# Patient Record
Sex: FEMALE | Race: WHITE | NOT HISPANIC OR LATINO | Employment: FULL TIME | URBAN - METROPOLITAN AREA
[De-identification: names, ages, dates, MRNs, and addresses within clinical notes are randomized per-mention and may not be internally consistent; named-entity substitution may affect disease eponyms.]

---

## 2020-08-17 ENCOUNTER — OFFICE VISIT (OUTPATIENT)
Dept: WOUND CARE | Facility: HOSPITAL | Age: 65
End: 2020-08-17
Attending: SURGERY
Payer: COMMERCIAL

## 2020-08-17 VITALS — HEART RATE: 82 BPM | SYSTOLIC BLOOD PRESSURE: 152 MMHG | DIASTOLIC BLOOD PRESSURE: 99 MMHG | TEMPERATURE: 98 F

## 2020-08-17 DIAGNOSIS — L58.9 POST-RADIATION DERMATITIS: ICD-10-CM

## 2020-08-17 DIAGNOSIS — T81.31XD POSTOPERATIVE WOUND DEHISCENCE, SUBSEQUENT ENCOUNTER: ICD-10-CM

## 2020-08-17 PROBLEM — T81.31XA WOUND DEHISCENCE, SURGICAL: Status: ACTIVE | Noted: 2020-08-17

## 2020-08-17 PROCEDURE — 99203 OFFICE O/P NEW LOW 30 MIN: CPT

## 2020-08-17 PROCEDURE — 99499 NO LOS: ICD-10-PCS | Mod: ,,, | Performed by: SURGERY

## 2020-08-17 PROCEDURE — 99499 UNLISTED E&M SERVICE: CPT | Mod: ,,, | Performed by: SURGERY

## 2020-08-17 PROCEDURE — 27201912 HC WOUND CARE DEBRIDEMENT SUPPLIES

## 2020-08-17 PROCEDURE — 11042 DBRDMT SUBQ TIS 1ST 20SQCM/<: CPT

## 2020-08-17 NOTE — PROGRESS NOTES
[facility]  Wound Care  History and Physical    Problem List Items Addressed This Visit     Wound dehiscence, surgical    Overview     65-year-old female with history of advanced breast cancer who underwent bilateral mastectomy several months ago.  Patient developed dehiscence of the lateral aspect of the left mastectomy site.  Patient subsequently received postoperative radiation and has open wound which has not resolved.  Patient developed some radiation dermatitis which is improving.  Patient seen recently in the office for this wound which had not resolved.  Patient started on Santyl last week.  Patient denies fever or chills.  She does have reports some pain over the area.  Patient has completed postoperative radiation about 2 months ago.  Patient denies any significant drainage         Current Assessment & Plan     Continue Santyl.  Calmoseptine to periwound area         Post-radiation dermatitis    Overview     Patient is status post left mastectomy and axillary dissection for breast cancer.  Patient status post postoperative radiation.  Patient with radiation dermatitis.  Patient has been applying Vaseline and steroid cream intermittently.                 History:    Past Medical History:   Diagnosis Date    Arthritis     Coronary artery disease     Fibromyalgia     Hyperlipidemia     Hypertension     Myocardial infarction 6/2013    Thyroid disease        Past Surgical History:   Procedure Laterality Date    TUBAL LIGATION         Family History   Problem Relation Age of Onset    Heart disease Mother     Heart disease Father     Diabetes Father         reports that she quit smoking about 18 years ago. She has a 13.00 pack-year smoking history. She has never used smokeless tobacco. She reports that she does not drink alcohol or use drugs.    has a current medication list which includes the following prescription(s): aspirin, cholecalciferol (vitamin d3), diclofenac sodium, fish  oil-omega-3 fatty acids, fluoxetine, gabapentin, levothyroxine, and metoprolol succinate.    Allergies:  Lisinopril    Review of Systems:  ROS  Denies fever chills  Denies cough or shortness of breath  Denies chest pain  Denies abdominal pain    There were no vitals filed for this visit.      BMI:  There is no height or weight on file to calculate BMI.    Physical Exam:  Physical Exam  Obese  Right mastectomy site well healed  Left mastectomy site with changes consistent with radiation.  There is a open full-thickness wound on the lateral aspect of the incision site.  There is a moderate amount of slough present.  There is no drainage.  There is no sign of infection.  A1C:  No results for input(s): HGBA1C in the last 2160 hours.  BMP:  No results for input(s): GLU, NA, K, CL, CO2, BUN, CREATININE, CALCIUM, MG in the last 2160 hours.   CBC:  No results for input(s): WBC, RBC, HGB, HCT, PLT, MCV, MCH, MCHC in the last 2160 hours.  CMP:  No results for input(s): GLU, CALCIUM, ALBUMIN, PROT, NA, K, CO2, CL, BUN, ALKPHOS, ALT, AST, BILITOT in the last 2160 hours.    Invalid input(s): CREATININ  PREALBUMIN:  No results for input(s): PREALBUMIN in the last 2160 hours.  WOUND CULTURES:  No results for input(s): LABAERO in the last 2160 hours.        Plan:  See Wound Docs note for plan and follow up.  Continue Santyl to the wound.  Calmoseptine to periwound area and chest wall      Kelton CANO Marino [facility]

## 2020-08-24 ENCOUNTER — OFFICE VISIT (OUTPATIENT)
Dept: WOUND CARE | Facility: HOSPITAL | Age: 65
End: 2020-08-24
Attending: SURGERY
Payer: COMMERCIAL

## 2020-08-24 VITALS
TEMPERATURE: 98 F | RESPIRATION RATE: 20 BRPM | HEART RATE: 68 BPM | DIASTOLIC BLOOD PRESSURE: 86 MMHG | SYSTOLIC BLOOD PRESSURE: 138 MMHG

## 2020-08-24 DIAGNOSIS — T81.31XA WOUND DEHISCENCE, SURGICAL: ICD-10-CM

## 2020-08-24 DIAGNOSIS — L58.9 POST-RADIATION DERMATITIS: ICD-10-CM

## 2020-08-24 PROCEDURE — 99499 UNLISTED E&M SERVICE: CPT | Mod: ,,, | Performed by: SURGERY

## 2020-08-24 PROCEDURE — 99499 NO LOS: ICD-10-PCS | Mod: ,,, | Performed by: SURGERY

## 2020-08-24 PROCEDURE — 27201912 HC WOUND CARE DEBRIDEMENT SUPPLIES

## 2020-08-24 PROCEDURE — 11042 DBRDMT SUBQ TIS 1ST 20SQCM/<: CPT

## 2020-08-24 NOTE — PROGRESS NOTES
[facility]  Wound Care  Progress Note    Problem List Items Addressed This Visit        Derm    Post-radiation dermatitis    Overview     Patient is status post left mastectomy and axillary dissection for breast cancer.  Patient status post postoperative radiation.  Patient with radiation dermatitis.  Patient has been applying Vaseline and steroid cream intermittently.            Other    Wound dehiscence, surgical    Overview     65-year-old female with history of advanced breast cancer who underwent bilateral mastectomy several months ago.  Patient developed dehiscence of the lateral aspect of the left mastectomy site.  Patient subsequently received postoperative radiation and has open wound which has not resolved.  Patient developed some radiation dermatitis which is improving.  Patient seen recently in the office for this wound which had not resolved.  Patient started on Santyl last week.  Patient denies fever or chills.  She does have reports some pain over the area.  Patient has completed postoperative radiation about 2 months ago.  Patient denies any significant drainage         Current Assessment & Plan     Wound improving.  Continue debridement to maintain active phase wound healing.  I do expect prolonged healing wound closure due to the radiation.               See wound doc progress notes. Documents will be scanned.        Sterling Tariq  [facility]

## 2020-08-31 ENCOUNTER — OFFICE VISIT (OUTPATIENT)
Dept: WOUND CARE | Facility: HOSPITAL | Age: 65
End: 2020-08-31
Attending: SURGERY
Payer: COMMERCIAL

## 2020-08-31 VITALS — DIASTOLIC BLOOD PRESSURE: 86 MMHG | TEMPERATURE: 98 F | SYSTOLIC BLOOD PRESSURE: 150 MMHG | HEART RATE: 80 BPM

## 2020-08-31 DIAGNOSIS — T81.31XD POSTOPERATIVE WOUND DEHISCENCE, SUBSEQUENT ENCOUNTER: ICD-10-CM

## 2020-08-31 PROCEDURE — 27201912 HC WOUND CARE DEBRIDEMENT SUPPLIES

## 2020-08-31 PROCEDURE — 99499 UNLISTED E&M SERVICE: CPT | Mod: ,,, | Performed by: SURGERY

## 2020-08-31 PROCEDURE — 99499 NO LOS: ICD-10-PCS | Mod: ,,, | Performed by: SURGERY

## 2020-08-31 PROCEDURE — 11042 DBRDMT SUBQ TIS 1ST 20SQCM/<: CPT

## 2020-08-31 NOTE — PROGRESS NOTES
[facility]  Wound Care  Progress Note    Problem List Items Addressed This Visit     Wound dehiscence, surgical    Overview     65-year-old female with history of advanced breast cancer who underwent bilateral mastectomy several months ago.  Patient developed dehiscence of the lateral aspect of the left mastectomy site.  Patient subsequently received postoperative radiation and has open wound which has not resolved.  Patient developed some radiation dermatitis which is improving.  Patient seen recently in the office for this wound which had not resolved.  Patient started on Santyl last week.  Patient denies fever or chills.  She does have reports some pain over the area.  Patient has completed postoperative radiation about 2 months ago.  Patient denies any significant drainage         Current Assessment & Plan     Wound slowly improving.  There is some granulation tissue present.  Moderate amount of slough remains.  Continued sharp debridement is needed.  Continue Santyl.               See wound doc progress notes. Documents will be scanned.        Kelton Alexander  [facility]

## 2020-09-14 ENCOUNTER — OFFICE VISIT (OUTPATIENT)
Dept: WOUND CARE | Facility: HOSPITAL | Age: 65
End: 2020-09-14
Attending: SURGERY
Payer: COMMERCIAL

## 2020-09-14 VITALS — DIASTOLIC BLOOD PRESSURE: 74 MMHG | SYSTOLIC BLOOD PRESSURE: 138 MMHG | TEMPERATURE: 98 F | HEART RATE: 72 BPM

## 2020-09-14 DIAGNOSIS — T81.31XD POSTOPERATIVE WOUND DEHISCENCE, SUBSEQUENT ENCOUNTER: ICD-10-CM

## 2020-09-14 PROCEDURE — 11042 DBRDMT SUBQ TIS 1ST 20SQCM/<: CPT

## 2020-09-14 PROCEDURE — 99499 NO LOS: ICD-10-PCS | Mod: ,,, | Performed by: SURGERY

## 2020-09-14 PROCEDURE — 27201912 HC WOUND CARE DEBRIDEMENT SUPPLIES

## 2020-09-14 PROCEDURE — 99499 UNLISTED E&M SERVICE: CPT | Mod: ,,, | Performed by: SURGERY

## 2020-09-14 NOTE — ASSESSMENT & PLAN NOTE
Wound consists mostly of slough.  Few areas of granulation tissue.  No sign of infection.  Continued sharp debridement is needed.  Continue Santyl.  Expect long course due to radiation

## 2020-09-14 NOTE — PROGRESS NOTES
[facility]  Wound Care  Progress Note    Problem List Items Addressed This Visit     Wound dehiscence, surgical    Overview     65-year-old female with history of advanced breast cancer who underwent bilateral mastectomy several months ago.  Patient developed dehiscence of the lateral aspect of the left mastectomy site.  Patient subsequently received postoperative radiation and has open wound which has not resolved.  Patient developed some radiation dermatitis which is improving.  Patient seen recently in the office for this wound which had not resolved.  Patient started on Santyl last week.  Patient denies fever or chills.  She does have reports some pain over the area.  Patient has completed postoperative radiation about 2 months ago.  Patient denies any significant drainage         Current Assessment & Plan     Wound consists mostly of slough.  Few areas of granulation tissue.  No sign of infection.  Continued sharp debridement is needed.  Continue Santyl.  Expect long course due to radiation               See wound doc progress notes. Documents will be scanned.        Kelton Alexander  [facility]

## 2020-09-21 ENCOUNTER — OFFICE VISIT (OUTPATIENT)
Dept: WOUND CARE | Facility: HOSPITAL | Age: 65
End: 2020-09-21
Attending: SURGERY
Payer: COMMERCIAL

## 2020-09-21 VITALS
RESPIRATION RATE: 18 BRPM | SYSTOLIC BLOOD PRESSURE: 132 MMHG | DIASTOLIC BLOOD PRESSURE: 70 MMHG | HEART RATE: 78 BPM | TEMPERATURE: 98 F

## 2020-09-21 DIAGNOSIS — L58.9 POST-RADIATION DERMATITIS: ICD-10-CM

## 2020-09-21 DIAGNOSIS — T81.31XD POSTOPERATIVE WOUND DEHISCENCE, SUBSEQUENT ENCOUNTER: Primary | ICD-10-CM

## 2020-09-21 PROCEDURE — 27201912 HC WOUND CARE DEBRIDEMENT SUPPLIES

## 2020-09-21 PROCEDURE — 11042 DBRDMT SUBQ TIS 1ST 20SQCM/<: CPT

## 2020-09-21 PROCEDURE — 99499 NO LOS: ICD-10-PCS | Mod: ,,, | Performed by: SURGERY

## 2020-09-21 PROCEDURE — 99499 UNLISTED E&M SERVICE: CPT | Mod: ,,, | Performed by: SURGERY

## 2020-09-21 NOTE — PROGRESS NOTES
[facility]  Wound Care  Progress Note    Problem List Items Addressed This Visit        Derm    Post-radiation dermatitis    Overview     Patient is status post left mastectomy and axillary dissection for breast cancer.  Patient status post postoperative radiation.  Patient with radiation dermatitis.  Patient has been applying Vaseline and steroid cream intermittently.            Other    Wound dehiscence, surgical - Primary    Overview     65-year-old female with history of advanced breast cancer who underwent bilateral mastectomy several months ago.  Patient developed dehiscence of the lateral aspect of the left mastectomy site.  Patient subsequently received postoperative radiation and has open wound which has not resolved.  Patient developed some radiation dermatitis which is improving.  Patient seen recently in the office for this wound which had not resolved.  Patient started on Santyl last week.  Patient denies fever or chills.  She does have reports some pain over the area.  Patient has completed postoperative radiation in June of 2020.  Patient denies any significant drainage         Current Assessment & Plan     Wound is improving.  There is less nonviable tissue.  Continue debridement to remove nonviable tissue and maintain active phase wound healing.  Continue Santyl.  Refill prescription.               See wound doc progress notes. Documents will be scanned.        Sterling Tariq  [facility]

## 2020-09-21 NOTE — ASSESSMENT & PLAN NOTE
Wound is improving.  There is less nonviable tissue.  Continue debridement to remove nonviable tissue and maintain active phase wound healing.  Continue Santyl.  Refill prescription.

## 2020-09-28 ENCOUNTER — OFFICE VISIT (OUTPATIENT)
Dept: WOUND CARE | Facility: HOSPITAL | Age: 65
End: 2020-09-28
Attending: SURGERY
Payer: COMMERCIAL

## 2020-09-28 VITALS
RESPIRATION RATE: 18 BRPM | HEART RATE: 78 BPM | SYSTOLIC BLOOD PRESSURE: 148 MMHG | DIASTOLIC BLOOD PRESSURE: 88 MMHG | TEMPERATURE: 98 F

## 2020-09-28 DIAGNOSIS — T81.31XD POSTOPERATIVE WOUND DEHISCENCE, SUBSEQUENT ENCOUNTER: ICD-10-CM

## 2020-09-28 PROCEDURE — 27201912 HC WOUND CARE DEBRIDEMENT SUPPLIES

## 2020-09-28 PROCEDURE — 99499 UNLISTED E&M SERVICE: CPT | Mod: ,,, | Performed by: SURGERY

## 2020-09-28 PROCEDURE — 11042 DBRDMT SUBQ TIS 1ST 20SQCM/<: CPT

## 2020-09-28 PROCEDURE — 99499 NO LOS: ICD-10-PCS | Mod: ,,, | Performed by: SURGERY

## 2020-09-28 NOTE — ASSESSMENT & PLAN NOTE
Wound slowly improving.  More areas of granulation tissue.  Continue sharp debridement is needed.  Continue Santyl.

## 2020-09-28 NOTE — PROGRESS NOTES
[facility]  Wound Care  Progress Note    Problem List Items Addressed This Visit     Wound dehiscence, surgical    Overview     65-year-old female with history of advanced breast cancer who underwent bilateral mastectomy several months ago.  Patient developed dehiscence of the lateral aspect of the left mastectomy site.  Patient subsequently received postoperative radiation and has open wound which has not resolved.  Patient developed some radiation dermatitis which is improving.  Patient seen recently in the office for this wound which had not resolved.  Patient started on Santyl last week.  Patient denies fever or chills.  She does have reports some pain over the area.  Patient has completed postoperative radiation in June of 2020.  Patient denies any significant drainage         Current Assessment & Plan     Wound slowly improving.  More areas of granulation tissue.  Continue sharp debridement is needed.  Continue Santyl.               See wound doc progress notes. Documents will be scanned.        Kelton Alexander  [facility]

## 2020-10-05 ENCOUNTER — OFFICE VISIT (OUTPATIENT)
Dept: WOUND CARE | Facility: HOSPITAL | Age: 65
End: 2020-10-05
Attending: SURGERY
Payer: COMMERCIAL

## 2020-10-05 VITALS — HEART RATE: 77 BPM | DIASTOLIC BLOOD PRESSURE: 85 MMHG | TEMPERATURE: 98 F | SYSTOLIC BLOOD PRESSURE: 119 MMHG

## 2020-10-05 DIAGNOSIS — T81.31XA POSTOPERATIVE WOUND DEHISCENCE, INITIAL ENCOUNTER: ICD-10-CM

## 2020-10-05 DIAGNOSIS — L58.9 POST-RADIATION DERMATITIS: Primary | ICD-10-CM

## 2020-10-05 PROCEDURE — 99499 NO LOS: ICD-10-PCS | Mod: ,,, | Performed by: SURGERY

## 2020-10-05 PROCEDURE — 27201912 HC WOUND CARE DEBRIDEMENT SUPPLIES

## 2020-10-05 PROCEDURE — 11042 DBRDMT SUBQ TIS 1ST 20SQCM/<: CPT

## 2020-10-05 PROCEDURE — 99499 UNLISTED E&M SERVICE: CPT | Mod: ,,, | Performed by: SURGERY

## 2020-10-05 NOTE — PROGRESS NOTES
[facility]  Wound Care  Progress Note    Problem List Items Addressed This Visit        Derm    Post-radiation dermatitis - Primary    Overview     Patient is status post left mastectomy and axillary dissection for breast cancer.  Patient status post postoperative radiation.  Patient with radiation dermatitis.  Patient has been applying Vaseline and steroid cream intermittently.            Other    Wound dehiscence, surgical    Overview     65-year-old female with history of advanced breast cancer who underwent bilateral mastectomy several months ago.  Patient developed dehiscence of the lateral aspect of the left mastectomy site.  Patient subsequently received postoperative radiation and has open wound which has not resolved.  Patient developed some radiation dermatitis which is improving.  Patient seen recently in the office for this wound which had not resolved.  Patient started on Santyl last week.  Patient denies fever or chills.  She does have reports some pain over the area.  Patient has completed postoperative radiation in June of 2020.  Patient denies any significant drainage         Current Assessment & Plan     Wound is slightly improved.  Continue Santyl.  Continue debridement to maintain active phase wound healing.               See wound doc progress notes. Documents will be scanned.        Sterling Tariq  [facility]

## 2020-10-05 NOTE — ASSESSMENT & PLAN NOTE
Wound is slightly improved.  Continue Santyl.  Continue debridement to maintain active phase wound healing.

## 2020-10-12 ENCOUNTER — OFFICE VISIT (OUTPATIENT)
Dept: WOUND CARE | Facility: HOSPITAL | Age: 65
End: 2020-10-12
Attending: SURGERY
Payer: COMMERCIAL

## 2020-10-12 VITALS — DIASTOLIC BLOOD PRESSURE: 84 MMHG | TEMPERATURE: 98 F | HEART RATE: 81 BPM | SYSTOLIC BLOOD PRESSURE: 126 MMHG

## 2020-10-12 DIAGNOSIS — T81.31XD POSTOPERATIVE WOUND DEHISCENCE, SUBSEQUENT ENCOUNTER: ICD-10-CM

## 2020-10-12 PROCEDURE — 27201912 HC WOUND CARE DEBRIDEMENT SUPPLIES

## 2020-10-12 PROCEDURE — 99499 NO LOS: ICD-10-PCS | Mod: ,,, | Performed by: SURGERY

## 2020-10-12 PROCEDURE — 11042 DBRDMT SUBQ TIS 1ST 20SQCM/<: CPT

## 2020-10-12 PROCEDURE — 99499 UNLISTED E&M SERVICE: CPT | Mod: ,,, | Performed by: SURGERY

## 2020-10-12 NOTE — ASSESSMENT & PLAN NOTE
Stable.  No sign of soft tissue infection.  Still moderate amount of nonviable tissue.  Continued sharp debridement is needed.  Continue Santyl.  Patient remains on adjuvant chemotherapy.  Do not expect drastic changes in the wound until chemotherapy is complete.  Patient has 3 more cycles of chemotherapy which will last another at 9 weeks.

## 2020-10-12 NOTE — PROGRESS NOTES
[facility]  Wound Care  Progress Note    Problem List Items Addressed This Visit     Wound dehiscence, surgical    Overview     65-year-old female with history of advanced breast cancer who underwent bilateral mastectomy several months ago.  Patient developed dehiscence of the lateral aspect of the left mastectomy site.  Patient subsequently received postoperative radiation and has open wound which has not resolved.  Patient developed some radiation dermatitis which is improving.  Patient seen recently in the office for this wound which had not resolved.  Patient started on Santyl last week.  Patient denies fever or chills.  She does have reports some pain over the area.  Patient has completed postoperative radiation in June of 2020.  Patient denies any significant drainage         Current Assessment & Plan     Stable.  No sign of soft tissue infection.  Still moderate amount of nonviable tissue.  Continued sharp debridement is needed.  Continue Santyl.  Patient remains on adjuvant chemotherapy.  Do not expect drastic changes in the wound until chemotherapy is complete.  Patient has 3 more cycles of chemotherapy which will last another at 9 weeks.               See wound doc progress notes. Documents will be scanned.        Kelton Alexander  [facility]

## 2020-10-19 ENCOUNTER — OFFICE VISIT (OUTPATIENT)
Dept: WOUND CARE | Facility: HOSPITAL | Age: 65
End: 2020-10-19
Attending: SURGERY
Payer: COMMERCIAL

## 2020-10-19 VITALS
TEMPERATURE: 97 F | SYSTOLIC BLOOD PRESSURE: 132 MMHG | RESPIRATION RATE: 18 BRPM | DIASTOLIC BLOOD PRESSURE: 78 MMHG | HEART RATE: 80 BPM

## 2020-10-19 DIAGNOSIS — T81.31XD POSTOPERATIVE WOUND DEHISCENCE, SUBSEQUENT ENCOUNTER: ICD-10-CM

## 2020-10-19 PROCEDURE — 99499 NO LOS: ICD-10-PCS | Mod: ,,, | Performed by: SURGERY

## 2020-10-19 PROCEDURE — 99499 UNLISTED E&M SERVICE: CPT | Mod: ,,, | Performed by: SURGERY

## 2020-10-19 PROCEDURE — 11042 DBRDMT SUBQ TIS 1ST 20SQCM/<: CPT

## 2020-10-19 PROCEDURE — 27201912 HC WOUND CARE DEBRIDEMENT SUPPLIES

## 2020-10-19 NOTE — PROGRESS NOTES
[facility]  Wound Care  Progress Note    Problem List Items Addressed This Visit     Wound dehiscence, surgical    Overview     65-year-old female with history of advanced breast cancer who underwent bilateral mastectomy several months ago.  Patient developed dehiscence of the lateral aspect of the left mastectomy site.  Patient subsequently received postoperative radiation and has open wound which has not resolved.  Patient developed some radiation dermatitis which is improving.  Patient seen recently in the office for this wound which had not resolved.  Patient started on Santyl last week.  Patient denies fever or chills.  She does have reports some pain over the area.  Patient has completed postoperative radiation in June of 2020.  Patient denies any significant drainage         Current Assessment & Plan     Moderate slough present.  Some areas of granulation.  No sign of infection.  Continued sharp debridement is needed.  Continue Santyl.  Patient remains on adjuvant chemotherapy               See wound doc progress notes. Documents will be scanned.        Kelton Alexander  [facility]

## 2020-10-19 NOTE — ASSESSMENT & PLAN NOTE
Moderate slough present.  Some areas of granulation.  No sign of infection.  Continued sharp debridement is needed.  Continue Santyl.  Patient remains on adjuvant chemotherapy

## 2020-10-26 ENCOUNTER — OFFICE VISIT (OUTPATIENT)
Dept: WOUND CARE | Facility: HOSPITAL | Age: 65
End: 2020-10-26
Attending: SURGERY
Payer: COMMERCIAL

## 2020-10-26 VITALS
TEMPERATURE: 98 F | SYSTOLIC BLOOD PRESSURE: 120 MMHG | DIASTOLIC BLOOD PRESSURE: 84 MMHG | HEART RATE: 82 BPM | RESPIRATION RATE: 20 BRPM

## 2020-10-26 DIAGNOSIS — T81.31XA POSTOPERATIVE WOUND DEHISCENCE, INITIAL ENCOUNTER: ICD-10-CM

## 2020-10-26 DIAGNOSIS — L58.9 POST-RADIATION DERMATITIS: Primary | ICD-10-CM

## 2020-10-26 PROCEDURE — 99499 UNLISTED E&M SERVICE: CPT | Mod: ,,, | Performed by: SURGERY

## 2020-10-26 PROCEDURE — 99499 NO LOS: ICD-10-PCS | Mod: ,,, | Performed by: SURGERY

## 2020-10-26 PROCEDURE — 27201912 HC WOUND CARE DEBRIDEMENT SUPPLIES

## 2020-10-26 PROCEDURE — 11042 DBRDMT SUBQ TIS 1ST 20SQCM/<: CPT

## 2020-10-26 NOTE — ASSESSMENT & PLAN NOTE
Wound showing limited improvement while on chemotherapy.  Continue debridement to remove nonviable tissue and maintain active phase of wound healing.  Continue Santyl.  Patient is to complete chemotherapy and 6 weeks.  We should note a more significant change following stopping chemotherapy.

## 2020-10-26 NOTE — PROGRESS NOTES
[facility]  Wound Care  Progress Note    Problem List Items Addressed This Visit        Derm    Post-radiation dermatitis - Primary    Overview     Patient is status post left mastectomy and axillary dissection for breast cancer.  Patient status post postoperative radiation.  Patient with radiation dermatitis.  Patient has been applying Vaseline and steroid cream intermittently.            Other    Wound dehiscence, surgical    Overview     65-year-old female with history of advanced breast cancer who underwent bilateral mastectomy several months ago.  Patient developed dehiscence of the lateral aspect of the left mastectomy site.  Patient subsequently received postoperative radiation and has open wound which has not resolved.  Patient developed some radiation dermatitis which is improving.  Patient seen recently in the office for this wound which had not resolved.  Patient started on Santyl last week.  Patient denies fever or chills.  She does have reports some pain over the area.  Patient has completed postoperative radiation in June of 2020.  Patient denies any significant drainage         Current Assessment & Plan     Wound showing limited improvement while on chemotherapy.  Continue debridement to remove nonviable tissue and maintain active phase of wound healing.  Continue Santyl.  Patient is to complete chemotherapy and 6 weeks.  We should note a more significant change following stopping chemotherapy.               See wound doc progress notes. Documents will be scanned.        Sterling Tariq  [facility]

## 2020-11-09 ENCOUNTER — OFFICE VISIT (OUTPATIENT)
Dept: WOUND CARE | Facility: HOSPITAL | Age: 65
End: 2020-11-09
Attending: SURGERY
Payer: COMMERCIAL

## 2020-11-09 VITALS
HEART RATE: 80 BPM | RESPIRATION RATE: 18 BRPM | SYSTOLIC BLOOD PRESSURE: 132 MMHG | DIASTOLIC BLOOD PRESSURE: 78 MMHG | TEMPERATURE: 98 F

## 2020-11-09 DIAGNOSIS — T81.31XD POSTOPERATIVE WOUND DEHISCENCE, SUBSEQUENT ENCOUNTER: ICD-10-CM

## 2020-11-09 PROCEDURE — 99499 NO LOS: ICD-10-PCS | Mod: ,,, | Performed by: SURGERY

## 2020-11-09 PROCEDURE — 27201912 HC WOUND CARE DEBRIDEMENT SUPPLIES

## 2020-11-09 PROCEDURE — 99499 UNLISTED E&M SERVICE: CPT | Mod: ,,, | Performed by: SURGERY

## 2020-11-09 PROCEDURE — 11042 DBRDMT SUBQ TIS 1ST 20SQCM/<: CPT

## 2020-11-09 NOTE — PROGRESS NOTES
[facility]  Wound Care  Progress Note    Problem List Items Addressed This Visit     Wound dehiscence, surgical    Overview     65-year-old female with history of advanced breast cancer who underwent bilateral mastectomy several months ago.  Patient developed dehiscence of the lateral aspect of the left mastectomy site.  Patient subsequently received postoperative radiation and has open wound which has not resolved.  Patient developed some radiation dermatitis which is improving.  Patient seen recently in the office for this wound which had not resolved.  Patient started on Santyl last week.  Patient denies fever or chills.  She does have reports some pain over the area.  Patient has completed postoperative radiation in June of 2020.  Patient denies any significant drainage         Current Assessment & Plan     Wound remains clean.  There is increase in the amount of granulation tissue.  Minimal slough present.  No sign of infection.  Periwound area has improved.  Continue Santyl.  Patient remains on chemotherapy.  Patient states she has treatment this week followed by 2 additional cycles but states she will be finished in about 4 weeks.               See wound doc progress notes. Documents will be scanned.        Kelton Alexander  [facility]

## 2020-11-09 NOTE — ASSESSMENT & PLAN NOTE
Wound remains clean.  There is increase in the amount of granulation tissue.  Minimal slough present.  No sign of infection.  Periwound area has improved.  Continue Santyl.  Patient remains on chemotherapy.  Patient states she has treatment this week followed by 2 additional cycles but states she will be finished in about 4 weeks.

## 2020-11-23 ENCOUNTER — OFFICE VISIT (OUTPATIENT)
Dept: WOUND CARE | Facility: HOSPITAL | Age: 65
End: 2020-11-23
Attending: SURGERY
Payer: COMMERCIAL

## 2020-11-23 VITALS — TEMPERATURE: 98 F | RESPIRATION RATE: 18 BRPM | HEART RATE: 64 BPM

## 2020-11-23 DIAGNOSIS — T81.31XD POSTOPERATIVE WOUND DEHISCENCE, SUBSEQUENT ENCOUNTER: Primary | ICD-10-CM

## 2020-11-23 DIAGNOSIS — L58.9 POST-RADIATION DERMATITIS: ICD-10-CM

## 2020-11-23 PROCEDURE — 11042 DBRDMT SUBQ TIS 1ST 20SQCM/<: CPT

## 2020-11-23 PROCEDURE — 99499 UNLISTED E&M SERVICE: CPT | Mod: ,,, | Performed by: SURGERY

## 2020-11-23 PROCEDURE — 99499 NO LOS: ICD-10-PCS | Mod: ,,, | Performed by: SURGERY

## 2020-11-23 PROCEDURE — 27201912 HC WOUND CARE DEBRIDEMENT SUPPLIES

## 2020-11-23 NOTE — PROGRESS NOTES
[facility]  Wound Care  Progress Note    Problem List Items Addressed This Visit     Wound dehiscence, surgical - Primary    Overview     65-year-old female with history of advanced breast cancer who underwent bilateral mastectomy several months ago.  Patient developed dehiscence of the lateral aspect of the left mastectomy site.  Patient subsequently received postoperative radiation and has open wound which has not resolved.  Patient developed some radiation dermatitis which is improving.  Patient seen recently in the office for this wound which had not resolved.  Patient started on Santyl last week.  Patient denies fever or chills.  She does have reports some pain over the area.  Patient has completed postoperative radiation in June of 2020.  Patient denies any significant drainage         Current Assessment & Plan     Wound continues to improve.  Continue with Santyl.         Post-radiation dermatitis    Overview     Patient is status post left mastectomy and axillary dissection for breast cancer.  Patient status post postoperative radiation.  Patient with radiation dermatitis.  Patient has been applying Vaseline and steroid cream intermittently.               See wound doc progress notes. Documents will be scanned.        Jamie Kaye  [facility]

## 2020-12-07 ENCOUNTER — OFFICE VISIT (OUTPATIENT)
Dept: WOUND CARE | Facility: HOSPITAL | Age: 65
End: 2020-12-07
Attending: SURGERY
Payer: COMMERCIAL

## 2020-12-07 VITALS
RESPIRATION RATE: 20 BRPM | DIASTOLIC BLOOD PRESSURE: 76 MMHG | SYSTOLIC BLOOD PRESSURE: 136 MMHG | TEMPERATURE: 98 F | HEART RATE: 82 BPM

## 2020-12-07 DIAGNOSIS — T81.31XD POSTOPERATIVE WOUND DEHISCENCE, SUBSEQUENT ENCOUNTER: ICD-10-CM

## 2020-12-07 PROCEDURE — 99499 UNLISTED E&M SERVICE: CPT | Mod: ,,, | Performed by: SURGERY

## 2020-12-07 PROCEDURE — 27201912 HC WOUND CARE DEBRIDEMENT SUPPLIES

## 2020-12-07 PROCEDURE — 99499 NO LOS: ICD-10-PCS | Mod: ,,, | Performed by: SURGERY

## 2020-12-07 PROCEDURE — 11042 DBRDMT SUBQ TIS 1ST 20SQCM/<: CPT

## 2020-12-07 NOTE — ASSESSMENT & PLAN NOTE
Wound markedly improved since I have last seen it.  The wound is mostly granulating with minimal slough present.  The wound has decreased in size.  Patient is nearing end of chemotherapy.  Continue sharp debridement and Santyl

## 2020-12-07 NOTE — PROGRESS NOTES
[facility]  Wound Care  Progress Note    Problem List Items Addressed This Visit     Wound dehiscence, surgical    Overview     65-year-old female with history of advanced breast cancer who underwent bilateral mastectomy several months ago.  Patient developed dehiscence of the lateral aspect of the left mastectomy site.  Patient subsequently received postoperative radiation and has open wound which has not resolved.  Patient developed some radiation dermatitis which is improving.  Patient seen recently in the office for this wound which had not resolved.  Patient started on Santyl last week.  Patient denies fever or chills.  She does have reports some pain over the area.  Patient has completed postoperative radiation in June of 2020.  Patient denies any significant drainage         Current Assessment & Plan     Wound markedly improved since I have last seen it.  The wound is mostly granulating with minimal slough present.  The wound has decreased in size.  Patient is nearing end of chemotherapy.  Continue sharp debridement and Santyl               See wound doc progress notes. Documents will be scanned.        Kelton Alexander  [facility]

## 2020-12-21 ENCOUNTER — OFFICE VISIT (OUTPATIENT)
Dept: WOUND CARE | Facility: HOSPITAL | Age: 65
End: 2020-12-21
Attending: SURGERY
Payer: COMMERCIAL

## 2020-12-21 VITALS — SYSTOLIC BLOOD PRESSURE: 124 MMHG | HEART RATE: 89 BPM | DIASTOLIC BLOOD PRESSURE: 76 MMHG | TEMPERATURE: 97 F

## 2020-12-21 DIAGNOSIS — T81.31XD POSTOPERATIVE WOUND DEHISCENCE, SUBSEQUENT ENCOUNTER: ICD-10-CM

## 2020-12-21 PROCEDURE — 99499 NO LOS: ICD-10-PCS | Mod: ,,, | Performed by: SURGERY

## 2020-12-21 PROCEDURE — 27201912 HC WOUND CARE DEBRIDEMENT SUPPLIES

## 2020-12-21 PROCEDURE — 99499 UNLISTED E&M SERVICE: CPT | Mod: ,,, | Performed by: SURGERY

## 2020-12-21 PROCEDURE — 11042 DBRDMT SUBQ TIS 1ST 20SQCM/<: CPT

## 2020-12-21 NOTE — PROGRESS NOTES
[facility]  Wound Care  Progress Note    Problem List Items Addressed This Visit     Wound dehiscence, surgical    Overview     65-year-old female with history of advanced breast cancer who underwent bilateral mastectomy several months ago.  Patient developed dehiscence of the lateral aspect of the left mastectomy site.  Patient subsequently received postoperative radiation and has open wound which has not resolved.  Patient developed some radiation dermatitis which is improving.  Patient seen recently in the office for this wound which had not resolved.  Patient started on Santyl last week.  Patient denies fever or chills.  She does have reports some pain over the area.  Patient has completed postoperative radiation in June of 2020.  Patient denies any significant drainage         Current Assessment & Plan     Patient is finished chemotherapy.  Patient's wound continues to improve.  There is a satellite lesion which is new but otherwise there is marked improvement.  Wound is all granulating with epithelialization at the wound edges.  We will stop the Santyl and implement collagen dressing               See wound doc progress notes. Documents will be scanned.        Kelton Alexander  [facility]

## 2020-12-21 NOTE — ASSESSMENT & PLAN NOTE
Patient is finished chemotherapy.  Patient's wound continues to improve.  There is a satellite lesion which is new but otherwise there is marked improvement.  Wound is all granulating with epithelialization at the wound edges.  We will stop the Santyl and implement collagen dressing

## 2021-01-04 ENCOUNTER — OFFICE VISIT (OUTPATIENT)
Dept: WOUND CARE | Facility: HOSPITAL | Age: 66
End: 2021-01-04
Attending: SURGERY
Payer: COMMERCIAL

## 2021-01-04 VITALS
RESPIRATION RATE: 18 BRPM | TEMPERATURE: 98 F | SYSTOLIC BLOOD PRESSURE: 140 MMHG | HEART RATE: 87 BPM | DIASTOLIC BLOOD PRESSURE: 94 MMHG

## 2021-01-04 DIAGNOSIS — T81.31XD POSTOPERATIVE WOUND DEHISCENCE, SUBSEQUENT ENCOUNTER: ICD-10-CM

## 2021-01-04 PROCEDURE — 27201912 HC WOUND CARE DEBRIDEMENT SUPPLIES

## 2021-01-04 PROCEDURE — 99499 NO LOS: ICD-10-PCS | Mod: ,,, | Performed by: SURGERY

## 2021-01-04 PROCEDURE — 99499 UNLISTED E&M SERVICE: CPT | Mod: ,,, | Performed by: SURGERY

## 2021-01-04 PROCEDURE — 11042 DBRDMT SUBQ TIS 1ST 20SQCM/<: CPT

## 2021-01-18 ENCOUNTER — OFFICE VISIT (OUTPATIENT)
Dept: WOUND CARE | Facility: HOSPITAL | Age: 66
End: 2021-01-18
Attending: SURGERY
Payer: COMMERCIAL

## 2021-01-18 VITALS
TEMPERATURE: 97 F | SYSTOLIC BLOOD PRESSURE: 128 MMHG | RESPIRATION RATE: 20 BRPM | HEART RATE: 88 BPM | DIASTOLIC BLOOD PRESSURE: 89 MMHG

## 2021-01-18 DIAGNOSIS — T81.31XD POSTOPERATIVE WOUND DEHISCENCE, SUBSEQUENT ENCOUNTER: ICD-10-CM

## 2021-01-18 PROCEDURE — 99499 NO LOS: ICD-10-PCS | Mod: ,,, | Performed by: SURGERY

## 2021-01-18 PROCEDURE — 99213 OFFICE O/P EST LOW 20 MIN: CPT

## 2021-01-18 PROCEDURE — 99499 UNLISTED E&M SERVICE: CPT | Mod: ,,, | Performed by: SURGERY

## 2021-03-05 ENCOUNTER — IMMUNIZATION (OUTPATIENT)
Dept: FAMILY MEDICINE | Facility: CLINIC | Age: 66
End: 2021-03-05
Payer: COMMERCIAL

## 2021-03-05 DIAGNOSIS — Z23 NEED FOR VACCINATION: Primary | ICD-10-CM

## 2021-03-05 PROCEDURE — 91300 COVID-19, MRNA, LNP-S, PF, 30 MCG/0.3 ML DOSE VACCINE: CPT | Mod: PBBFAC | Performed by: FAMILY MEDICINE

## 2021-03-26 ENCOUNTER — IMMUNIZATION (OUTPATIENT)
Dept: FAMILY MEDICINE | Facility: CLINIC | Age: 66
End: 2021-03-26
Payer: COMMERCIAL

## 2021-03-26 DIAGNOSIS — Z23 NEED FOR VACCINATION: Primary | ICD-10-CM

## 2021-03-26 PROCEDURE — 0002A COVID-19, MRNA, LNP-S, PF, 30 MCG/0.3 ML DOSE VACCINE: CPT | Mod: PBBFAC | Performed by: FAMILY MEDICINE

## 2021-03-26 PROCEDURE — 91300 COVID-19, MRNA, LNP-S, PF, 30 MCG/0.3 ML DOSE VACCINE: CPT | Mod: PBBFAC | Performed by: FAMILY MEDICINE

## 2021-12-20 NOTE — ASSESSMENT & PLAN NOTE
Wound slowly improving.  There is some granulation tissue present.  Moderate amount of slough remains.  Continued sharp debridement is needed.  Continue Santyl.  
show